# Patient Record
Sex: MALE | Race: WHITE | Employment: OTHER | URBAN - NONMETROPOLITAN AREA
[De-identification: names, ages, dates, MRNs, and addresses within clinical notes are randomized per-mention and may not be internally consistent; named-entity substitution may affect disease eponyms.]

---

## 2019-09-24 ENCOUNTER — ONCOLOGY VISIT (OUTPATIENT)
Dept: RADIATION ONCOLOGY | Facility: HOSPITAL | Age: 74
End: 2019-09-24
Attending: RADIOLOGY
Payer: COMMERCIAL

## 2019-09-24 VITALS
HEART RATE: 52 BPM | DIASTOLIC BLOOD PRESSURE: 60 MMHG | HEIGHT: 72 IN | RESPIRATION RATE: 16 BRPM | SYSTOLIC BLOOD PRESSURE: 114 MMHG | BODY MASS INDEX: 32.74 KG/M2 | WEIGHT: 241.7 LBS

## 2019-09-24 DIAGNOSIS — C61 PROSTATE CANCER (H): Primary | ICD-10-CM

## 2019-09-24 PROCEDURE — 99204 OFFICE O/P NEW MOD 45 MIN: CPT | Performed by: RADIOLOGY

## 2019-09-24 PROCEDURE — G0463 HOSPITAL OUTPT CLINIC VISIT: HCPCS | Performed by: RADIOLOGY

## 2019-09-24 RX ORDER — ATORVASTATIN CALCIUM 40 MG/1
TABLET, FILM COATED ORAL
COMMUNITY
Start: 2019-03-04

## 2019-09-24 RX ORDER — LISINOPRIL 40 MG/1
40 TABLET ORAL
COMMUNITY
Start: 2019-09-08

## 2019-09-24 RX ORDER — DOCUSATE SODIUM 100 MG/1
CAPSULE, LIQUID FILLED ORAL
Refills: 0 | COMMUNITY
Start: 2018-12-05

## 2019-09-24 RX ORDER — WARFARIN SODIUM 5 MG/1
TABLET ORAL
COMMUNITY
Start: 2019-09-11

## 2019-09-24 RX ORDER — SENNA AND DOCUSATE SODIUM 50; 8.6 MG/1; MG/1
2 TABLET, FILM COATED ORAL
COMMUNITY
Start: 2019-03-13

## 2019-09-24 RX ORDER — TAMSULOSIN HYDROCHLORIDE 0.4 MG/1
0.4 CAPSULE ORAL DAILY
COMMUNITY
Start: 2019-02-18

## 2019-09-24 RX ORDER — GABAPENTIN 300 MG/1
900 CAPSULE ORAL
COMMUNITY
Start: 2019-04-09

## 2019-09-24 RX ORDER — GLIMEPIRIDE 4 MG/1
TABLET ORAL
COMMUNITY
Start: 2018-10-03

## 2019-09-24 RX ORDER — CARVEDILOL 12.5 MG/1
TABLET ORAL
COMMUNITY
Start: 2018-10-03

## 2019-09-24 RX ORDER — FUROSEMIDE 20 MG
20 TABLET ORAL
COMMUNITY
Start: 2019-03-13

## 2019-09-24 RX ORDER — AMLODIPINE BESYLATE 10 MG/1
5 TABLET ORAL
COMMUNITY
Start: 2019-01-25

## 2019-09-24 RX ORDER — PAROXETINE 20 MG/1
20 TABLET, FILM COATED ORAL
COMMUNITY
Start: 2019-02-22

## 2019-09-24 RX ORDER — FLUTICASONE PROPIONATE 50 MCG
SPRAY, SUSPENSION (ML) NASAL
COMMUNITY
Start: 2017-04-13

## 2019-09-24 SDOH — HEALTH STABILITY: MENTAL HEALTH: HOW OFTEN DO YOU HAVE A DRINK CONTAINING ALCOHOL?: MONTHLY OR LESS

## 2019-09-24 SDOH — HEALTH STABILITY: MENTAL HEALTH: HOW OFTEN DO YOU HAVE 6 OR MORE DRINKS ON ONE OCCASION?: NOT ASKED

## 2019-09-24 SDOH — HEALTH STABILITY: MENTAL HEALTH: HOW MANY STANDARD DRINKS CONTAINING ALCOHOL DO YOU HAVE ON A TYPICAL DAY?: 1 OR 2

## 2019-09-24 ASSESSMENT — PATIENT HEALTH QUESTIONNAIRE - PHQ9: SUM OF ALL RESPONSES TO PHQ QUESTIONS 1-9: 4

## 2019-09-24 ASSESSMENT — MIFFLIN-ST. JEOR: SCORE: 1864.34

## 2019-09-24 ASSESSMENT — PAIN SCALES - GENERAL: PAINLEVEL: NO PAIN (0)

## 2019-09-24 NOTE — PROGRESS NOTES
RADIATION ONCOLOGY CONSULTATION       REFERRING PHYSICIANS: Abdon Watkins MD, Heladio Roy MD      DIAGNOSIS:   Adenocarcinoma of the prostate Gerald 7 (4+3), PSA 11.7, clinical T2b.      HISTORY OF PRESENT ILLNESS:   74-year-old man was being followed for PSA elevation.  In February 2016 it was 2.88.  PSA December 2018 was 9.13.  PSA  August 5, 2019 was 11.25 and on September 11, 2019 it was 11.7.  Ultrasound-guided biopsy of the prostate was done March 26, 2019.  At the time of the biopsy the prostate was estimated to be 20.5 cm .  12 cores were obtained.  6 of 12 cores showed adenocarcinoma with 4 cores showing [place] 7 (4+3) and 2 cores showing [place] 7 (3+4). All positive biopsies were from the left prostate. Perineural invasion was identified.  The patient elected initially to follow the PSA and is now seeking a consultation with the value of PSA having risen to 11.7.  He is the main caretaker of his wife who is wheelchair-bound and has had a stroke.  He has brothers who have a history of prostate cancer.  One brother had a seed implant done in [facility] about 20 to 25 years ago.  For this reason the patient is interested in information about prostate brachytherapy as well.      Past Medical History:   Diagnosis Date     DMII (diabetes mellitus, type 2) (H)      HLD (hyperlipidemia)      HTN (hypertension)      Prostate cancer (H)        There is no problem list on file for this patient.     ALLERGIES:  No Known Allergies         Family History   Problem Relation Age of Onset     Throat cancer Father      Prostate Cancer Brother      Prostate Cancer Brother          Social History     Socioeconomic History     Marital status:      Spouse name: Deepthi     Number of children: 1     Years of education: Not on file     Highest education level: Not on file   Occupational History     Occupation: Retail store development     Comment: retired   Social Needs     Financial resource strain: Not on  file     Food insecurity:     Worry: Not on file     Inability: Not on file     Transportation needs:     Medical: Not on file     Non-medical: Not on file   Tobacco Use     Smoking status: Former Smoker     Packs/day: 1.50     Years: 25.00     Pack years: 37.50     Types: Cigarettes     Last attempt to quit:      Years since quittin.7     Smokeless tobacco: Never Used   Substance and Sexual Activity     Alcohol use: Yes     Frequency: Monthly or less     Drinks per session: 1 or 2     Drug use: Not on file     Sexual activity: Not on file   Lifestyle     Physical activity:     Days per week: Not on file     Minutes per session: Not on file     Stress: Not on file   Relationships     Social connections:     Talks on phone: Not on file     Gets together: Not on file     Attends Oriental orthodox service: Not on file     Active member of club or organization: Not on file     Attends meetings of clubs or organizations: Not on file     Relationship status: Not on file     Intimate partner violence:     Fear of current or ex partner: Not on file     Emotionally abused: Not on file     Physically abused: Not on file     Forced sexual activity: Not on file   Other Topics Concern     Not on file   Social History Narrative     Not on file      PHYSICAL EXAMINATION:  GENERAL:  Reveals a cooperative, fairly healthy-appearing male.  He walks into the clinic accompanied by his son-in-law.  VITAL SIGNS:    /60 (BP Location: Left arm, Patient Position: Chair, Cuff Size: Adult Regular)   Pulse 52   Resp 16   Ht 1.829 m (6')   Wt 109.6 kg (241 lb 11.2 oz)   BMI 32.78 kg/m    ENT: Oral cavity without visible lesions.  He has upper dentures.  He has implants in the front of the lower mouth.  Neck is without palpable adenopathy.  Chest: He has distant breath sounds.  He has expiratory rhonchi throughout the chest.  Heart: S1 and S2 with irregular rhythm.  Abdomen: No distention, tenderness or palpable abdominal masses.  On  rectal examination there are no rectal lesions.  The inferior portion of the prostate is palpable with a nodularity left lower lateral prostate.  Extremities: Mild bilateral ankle edema.  No lesions noted.  Neuro: No lateralizing cranial nerve, motor or gait deficits.    IMPRESSION: 74-year-old man with adenocarcinoma the prostate [place] 7 (4+3), PSA 11.7, T2b, with 6 of 12 cores positive.      PLAN: I discussed radiation options with the patient and his son-in-law.  We discussed external beam therapy using conventional fractionation over 7 to 8 weeks with or without 4-6 month androgen deprivation therapy.  As per his request we discussed low-dose brachytherapy alone or in combination with external beam therapy.  We discussed high-dose-rate brachytherapy alone or in combination with external beam therapy.  I discussed the purpose, alternatives, and possible risks of these treatment approaches with the patient and his son-in-law.  I answered questions with them.  I indicated that I would lean towards external beam therapy with or without hormone treatment in his situation.  He is still interested in possible brachytherapy and would like to have more information on where that might be available.  I will gather more information for him about that possible treatment.  In light of guidelines for his risk category and risk calculators for lymph node rates, he is a candidate for a diagnostic bone scan and CT abdomen and pelvis and these were ordered.  I will communicate with him about possible brachytherapy facilities and results of the upcoming scans and then he will a begin to make final decisions regarding treatment.          Walter Laurent MD

## 2019-09-24 NOTE — PROGRESS NOTES
"INITIAL PATIENT ASSESSMENT    Chief Complaint   Patient presents with     Radiation Therapy       Initial /60 (BP Location: Left arm, Patient Position: Chair, Cuff Size: Adult Regular)   Pulse 52   Resp 16   Ht 1.829 m (6')   Wt 109.6 kg (241 lb 11.2 oz)   BMI 32.78 kg/m   Estimated body mass index is 32.78 kg/m  as calculated from the following:    Height as of this encounter: 1.829 m (6').    Weight as of this encounter: 109.6 kg (241 lb 11.2 oz).  Medication Reconciliation: complete        Referring Physician: keisha Gamboa  Other Physicians: [place]    Diagnosis: Prostate Cancer    Prior radiation therapy: None    Prior chemotherapy: None    Prior hormonal therapy:No    Pain Eval:  Denies    Psychosocial  Marital Status:    Spouse/Significant other: Deepthi    Children: 1  Occupation: retail store development     Retired: Yes  Living arrangements: home with wife  Do you feel safe at home? Yes  Activity status: independent   referral needs: Not needed    Advanced Directive: Yes - Location: at home, pt will bring in a copy    Patient was assessed for the influenza, pneumo-poly, prevnar 13, Tdap, and shingles immunizations. \"Vaccinations and Cancer Treatment\" flyer given.  Instructed patient to discuss vaccination status with his/her PCM.     Patient was assessed using the NCCN psychosocial distress thermometer. Patient rated the score as a 0. Patient rated current stressors as n/a. Stressors will be brought to the attention of provider or Oncology RN Care Coordinator for a score of 6 or greater or per nurses discretion.     Pt is here today for a consult for radiation therapy for prostate cancer.  Educated patient on the mapping process and the possible side effects of external beam XRT to the pelvis, to include: fatigue, skin reaction, diarrhea, and bladder irritation.  Pt does express a desire to explore brachytherapy options, as well.  Dr. Laurent notified.  Pt verbalizes " an understanding and has no questions at this time. Pt is accompanied by his son today.    Edi Carballo RN

## 2019-09-24 NOTE — PROGRESS NOTES
"[facility]  Nutrition Assessment    Max Calabrese    1945   Sep 24, 2019    Diagnosis: prostate cancer    Height: 6' 0\" Weight: 241 lbs 11.2 oz    Usual Weight: 241 Weight Change: 0      Past Medical History:   Diagnosis Date     DMII (diabetes mellitus, type 2) (H)      HLD (hyperlipidemia)      HTN (hypertension)      Prostate cancer (H)        1. Receiving Chemotherapy? No - 0 points  2. Weight Loss per Month (in pounds) Based on Usual Weight: 0    100# 100-120# 120-150# 150-200# >200# Pt. System   > 5 5 - 6 6 - 8 7 - 10 > 10 1 Point   > 7 7- 9 9 - 11 11 - 14 > 15 2 Points   > 10 10 - 12 12 - 15 15 - 20 > 20 3 Points       3. Eating Problems: ( 1 Point for Each Problem)       Loss of Appetite     No - 0 points    Difficulty Swallowing    No - 0 points   Nausea    No - 0 points    Early Satiety    No - 0 points   Vomiting    No - 0 points    Taste/Smell Aversions  No - 0 points    Diarrhea    No - 0 points    Esophageal Reflux   No - 0 points   Mouth Soreness/Difficulty Chewing  No - 0 points          Total: 0    4.  Automatic Referral for the Following Diagnosis or Situations: n/a     Comments: n/a    Total Score: 0 (Scores >/= 4 will receive a Dietician Consult)        Edi Carballo RN  "

## 2019-09-27 ENCOUNTER — VIRTUAL VISIT (OUTPATIENT)
Dept: RADIATION ONCOLOGY | Facility: HOSPITAL | Age: 74
End: 2019-09-27

## 2019-09-27 DIAGNOSIS — C61 PROSTATE CANCER (H): Primary | ICD-10-CM

## 2019-09-27 NOTE — PROGRESS NOTES
PHONE CALL    The patient had asked for more information about possible prostate brachytherapy. I contacted [facility] and I discussed Mr. Calabrese's case with hs Urologist, Dr. Gamboa. It is felt he is not a good candidate for brachytherapy in light of his stage of disease and his other medical issues. I communicated by phone with the patient last evening the above information and recommended external beam irradiation along with short course androgen deprivation therapy. We will proceed with the staging CT scans and bone scan and review results and then, if no metastatic disease, schedule simulation and and initial one month hormone shot. He seems to understand and agrees with this plan. I asked him to contact us if he has questions.     Walter Laurent MD

## 2019-10-03 ENCOUNTER — TRANSFERRED RECORDS (OUTPATIENT)
Dept: HEALTH INFORMATION MANAGEMENT | Facility: CLINIC | Age: 74
End: 2019-10-03

## 2019-10-09 ENCOUNTER — VIRTUAL VISIT (OUTPATIENT)
Dept: RADIATION ONCOLOGY | Facility: HOSPITAL | Age: 74
End: 2019-10-09

## 2019-10-09 DIAGNOSIS — C61 PROSTATE CANCER (H): Primary | ICD-10-CM

## 2019-10-09 NOTE — PROGRESS NOTES
Radiation Oncology  Phone Note    CT Chest Abdomen Pelvis October 3, 2019 were without evidence of metastatic disease.     The plan is ADH and external beam radiation treatment, 75-77 Gy over 8 weeks. I discussed the patient's case again with Dr. Gamboa. He agrees to contact the patient to give the ADH. On a phone call with the patient today, Mr. Calabrese will proceed with simulation 1-2 months after the ADH starts. I will contact him again the week of October 28 to schedule simulation so that he could start treatment 1-2 weeks after simulation. If he does not hear from Dr. Gamboa's office today he will contact them tomorrow or the next day. Mr. Calabrese will contact us if he has other questions.     Walter Laurent MD

## 2019-10-16 DIAGNOSIS — Z12.5 SPECIAL SCREENING FOR MALIGNANT NEOPLASM OF PROSTATE: Primary | ICD-10-CM

## 2019-10-31 ENCOUNTER — HOSPITAL ENCOUNTER (OUTPATIENT)
Dept: MRI IMAGING | Facility: HOSPITAL | Age: 74
Setting detail: RADIATION/ONCOLOGY SERIES
End: 2019-10-31
Attending: RADIOLOGY
Payer: MEDICARE

## 2019-10-31 ENCOUNTER — ALLIED HEALTH/NURSE VISIT (OUTPATIENT)
Dept: RADIATION ONCOLOGY | Facility: HOSPITAL | Age: 74
End: 2019-10-31
Attending: RADIOLOGY
Payer: COMMERCIAL

## 2019-10-31 DIAGNOSIS — C61 PROSTATE CANCER (H): Primary | ICD-10-CM

## 2019-10-31 DIAGNOSIS — Z12.5 SPECIAL SCREENING FOR MALIGNANT NEOPLASM OF PROSTATE: ICD-10-CM

## 2019-10-31 PROCEDURE — 77334 RADIATION TREATMENT AID(S): CPT | Performed by: RADIOLOGY

## 2019-10-31 PROCEDURE — 77334 RADIATION TREATMENT AID(S): CPT | Mod: 26 | Performed by: RADIOLOGY

## 2019-10-31 PROCEDURE — 40000268 MR LIMITED SCAN: Mod: TC

## 2019-10-31 PROCEDURE — 77263 THER RADIOLOGY TX PLNG CPLX: CPT | Performed by: RADIOLOGY

## 2019-10-31 NOTE — PROGRESS NOTES
Encounter Date: Oct 31, 2019  Walter Laurent MD     RADIATION THERAPY SIMULATION NOTE       Max Calabrese was brought into the simulation suite and placed in a modified supine position. He had followed the bladder protocol. Careful alignment was accomplished using orthogonal lasers.  Positioning was aided with the use of a vacuum bag.  Imaging was acquired through the pelvic area and isocenter placed in the region of the prostatic fossa.  All imaging will be used for highly conformal IMRT-based treatment planning for the purpose of  radiation therapy for intermediate risk, unfavorable, prostate cancer.  IMRT techniques will allow proper target dose delineation, isodose planning and dose escalation with adequate sparing of bladder, rectum and bilateral proximal femurs and potentially better delineation of previous radiation therapy treatment volume for stage. An MRI of the prostate was obtained to aid with treatment planning. A post void CT scan at the end of simulation showed residual of 390 ml. The patient is on one flomax per day. He reports nocturia twice a night and no difficulty with starting urination. He does not wear a pad and on occasion has mild stress incontinence. He is not really bothered by urinary symptoms per history today. He started on Lupron several weeks ago. MRI shows significant disease in the prostate. Discussed with Dr. Gamboa. Will proceed with planning and treatment.            Walter Laurent MD

## 2019-11-01 ENCOUNTER — TELEPHONE (OUTPATIENT)
Dept: RADIATION ONCOLOGY | Facility: HOSPITAL | Age: 74
End: 2019-11-01

## 2019-11-01 NOTE — TELEPHONE ENCOUNTER
RADIATION ONCOLOGY    Telephone note    I called the patient and left a message that we will be proceeding with radiation treatment as planned. Our RTT''s will contact him with the schedule when available from Dosimetry.     Walter Laurent MD

## 2019-11-14 PROCEDURE — 77300 RADIATION THERAPY DOSE PLAN: CPT | Mod: 26 | Performed by: RADIOLOGY

## 2019-11-14 PROCEDURE — 77338 DESIGN MLC DEVICE FOR IMRT: CPT | Performed by: RADIOLOGY

## 2019-11-14 PROCEDURE — 77300 RADIATION THERAPY DOSE PLAN: CPT | Performed by: RADIOLOGY

## 2019-11-14 PROCEDURE — 77338 DESIGN MLC DEVICE FOR IMRT: CPT | Mod: 26 | Performed by: RADIOLOGY

## 2019-11-15 PROCEDURE — 77301 RADIOTHERAPY DOSE PLAN IMRT: CPT | Performed by: RADIOLOGY

## 2019-11-15 PROCEDURE — 77301 RADIOTHERAPY DOSE PLAN IMRT: CPT | Mod: 26 | Performed by: RADIOLOGY

## 2019-11-18 ENCOUNTER — APPOINTMENT (OUTPATIENT)
Dept: RADIATION ONCOLOGY | Facility: HOSPITAL | Age: 74
End: 2019-11-18
Attending: RADIOLOGY
Payer: MEDICARE

## 2019-11-18 ENCOUNTER — RESULTS ONLY (OUTPATIENT)
Dept: RADIATION ONCOLOGY | Facility: HOSPITAL | Age: 74
End: 2019-11-18

## 2019-11-18 LAB
RAD ONC ARIA COURSE ID: NORMAL
RAD ONC ARIA COURSE LAST TREATMENT DATE: NORMAL
RAD ONC ARIA COURSE START DATE: NORMAL
RAD ONC ARIA COURSE TREATMENT ELAPSED DAYS: 0
RAD ONC ARIA FIRST TREATMENT DATE: NORMAL
RAD ONC ARIA PLAN FRACTIONS TREATED TO DATE: 1
RAD ONC ARIA PLAN ID: NORMAL
RAD ONC ARIA PLAN NAME: NORMAL
RAD ONC ARIA PLAN PRESCRIBED DOSE PER FRACTION: 2 GY
RAD ONC ARIA PLAN TOTAL FRACTIONS PRESCRIBED: 38
RAD ONC ARIA PLAN TOTAL PRESCRIBED DOSE: 7600 CGY
RAD ONC ARIA REFERENCE POINT DOSAGE GIVEN TO DATE: NORMAL GY
RAD ONC ARIA REFERENCE POINT ID: NORMAL

## 2019-11-18 PROCEDURE — 77014 ZZHC CT GUIDE FOR PLACEMENT RADIATION THERAPY FIELDS: CPT | Mod: 26 | Performed by: RADIOLOGY

## 2019-11-18 PROCEDURE — 77385 ZZH IMRT TREATMENT DELIVERY, SIMPLE: CPT | Performed by: RADIOLOGY

## 2019-11-19 ENCOUNTER — APPOINTMENT (OUTPATIENT)
Dept: RADIATION ONCOLOGY | Facility: HOSPITAL | Age: 74
End: 2019-11-19
Payer: COMMERCIAL

## 2019-11-19 ENCOUNTER — RESULTS ONLY (OUTPATIENT)
Dept: RADIATION ONCOLOGY | Facility: HOSPITAL | Age: 74
End: 2019-11-19

## 2019-11-19 LAB
RAD ONC ARIA COURSE ID: NORMAL
RAD ONC ARIA COURSE LAST TREATMENT DATE: NORMAL
RAD ONC ARIA COURSE START DATE: NORMAL
RAD ONC ARIA COURSE TREATMENT ELAPSED DAYS: 1
RAD ONC ARIA FIRST TREATMENT DATE: NORMAL
RAD ONC ARIA PLAN FRACTIONS TREATED TO DATE: 2
RAD ONC ARIA PLAN ID: NORMAL
RAD ONC ARIA PLAN NAME: NORMAL
RAD ONC ARIA PLAN PRESCRIBED DOSE PER FRACTION: 2 GY
RAD ONC ARIA PLAN TOTAL FRACTIONS PRESCRIBED: 38
RAD ONC ARIA PLAN TOTAL PRESCRIBED DOSE: 7600 CGY
RAD ONC ARIA REFERENCE POINT DOSAGE GIVEN TO DATE: NORMAL GY
RAD ONC ARIA REFERENCE POINT ID: NORMAL

## 2019-11-19 PROCEDURE — 77014 ZZHC CT GUIDE FOR PLACEMENT RADIATION THERAPY FIELDS: CPT | Mod: 26 | Performed by: RADIOLOGY

## 2019-11-19 PROCEDURE — 77385 ZZH IMRT TREATMENT DELIVERY, SIMPLE: CPT | Performed by: RADIOLOGY

## 2019-11-20 ENCOUNTER — RESULTS ONLY (OUTPATIENT)
Dept: RADIATION ONCOLOGY | Facility: HOSPITAL | Age: 74
End: 2019-11-20

## 2019-11-20 ENCOUNTER — OFFICE VISIT (OUTPATIENT)
Dept: RADIATION ONCOLOGY | Facility: HOSPITAL | Age: 74
End: 2019-11-20
Attending: RADIOLOGY
Payer: MEDICARE

## 2019-11-20 VITALS
WEIGHT: 247.2 LBS | DIASTOLIC BLOOD PRESSURE: 72 MMHG | SYSTOLIC BLOOD PRESSURE: 144 MMHG | HEART RATE: 52 BPM | RESPIRATION RATE: 16 BRPM | BODY MASS INDEX: 33.53 KG/M2

## 2019-11-20 DIAGNOSIS — C61 PROSTATE CANCER (H): Primary | ICD-10-CM

## 2019-11-20 LAB
RAD ONC ARIA COURSE ID: NORMAL
RAD ONC ARIA COURSE LAST TREATMENT DATE: NORMAL
RAD ONC ARIA COURSE START DATE: NORMAL
RAD ONC ARIA COURSE TREATMENT ELAPSED DAYS: 2
RAD ONC ARIA FIRST TREATMENT DATE: NORMAL
RAD ONC ARIA PLAN FRACTIONS TREATED TO DATE: 3
RAD ONC ARIA PLAN ID: NORMAL
RAD ONC ARIA PLAN NAME: NORMAL
RAD ONC ARIA PLAN PRESCRIBED DOSE PER FRACTION: 2 GY
RAD ONC ARIA PLAN TOTAL FRACTIONS PRESCRIBED: 38
RAD ONC ARIA PLAN TOTAL PRESCRIBED DOSE: 7600 CGY
RAD ONC ARIA REFERENCE POINT DOSAGE GIVEN TO DATE: NORMAL GY
RAD ONC ARIA REFERENCE POINT ID: NORMAL

## 2019-11-20 PROCEDURE — 77385 ZZH IMRT TREATMENT DELIVERY, SIMPLE: CPT | Performed by: RADIOLOGY

## 2019-11-20 PROCEDURE — 77014 ZZHC CT GUIDE FOR PLACEMENT RADIATION THERAPY FIELDS: CPT | Mod: 26 | Performed by: RADIOLOGY

## 2019-11-20 ASSESSMENT — PAIN SCALES - GENERAL: PAINLEVEL: NO PAIN (0)

## 2019-11-20 NOTE — PROGRESS NOTES
Radiation Oncology - On Treatment Visit Note     ID: 74 year old male with The encounter diagnosis was Prostate cancer (H).. He is here today during treatment for an on treatment visit.     Planned Total RT Dose: 76 Gy.   Current Fraction: 3 of 38.   Concurrent Chemotherapy: ADT     SUBJECTIVE:   No side effects yet.  Has an hour drive and asking if I can reduce the number of treatments.       OBJECTIVE:   Vital Signs: BP (!) 144/72 (BP Location: Left arm, Patient Position: Chair, Cuff Size: Adult Large)   Pulse 52   Resp 16   Wt 112.1 kg (247 lb 3.2 oz)   BMI 33.53 kg/m   Pain Score: 0   Wt Readings from Last 5 Encounters:   11/20/19 112.1 kg (247 lb 3.2 oz)   09/24/19 109.6 kg (241 lb 11.2 oz)      ASSESSMENT/PLAN:   Unfavorable intermediate risk prostate cancer (cT2b, [place] 4+3, pretreatment PSA 11.7 ng/ml)  Definitive EBRT + ADT  The patient's chart and films were reviewed.   Cont RT.   I will evaluate if shortening course to ~70Gy/28fxs is safe for him and will alter his treatments if so.      Leo Santos MD, MPH   Radiation Oncologist - [facility]

## 2019-11-22 ENCOUNTER — RESULTS ONLY (OUTPATIENT)
Dept: RADIATION ONCOLOGY | Facility: HOSPITAL | Age: 74
End: 2019-11-22

## 2019-11-22 ENCOUNTER — APPOINTMENT (OUTPATIENT)
Dept: RADIATION ONCOLOGY | Facility: HOSPITAL | Age: 74
End: 2019-11-22
Payer: COMMERCIAL

## 2019-11-22 LAB
RAD ONC ARIA COURSE ID: NORMAL
RAD ONC ARIA COURSE LAST TREATMENT DATE: NORMAL
RAD ONC ARIA COURSE START DATE: NORMAL
RAD ONC ARIA COURSE TREATMENT ELAPSED DAYS: 4
RAD ONC ARIA FIRST TREATMENT DATE: NORMAL
RAD ONC ARIA PLAN FRACTIONS TREATED TO DATE: 4
RAD ONC ARIA PLAN ID: NORMAL
RAD ONC ARIA PLAN NAME: NORMAL
RAD ONC ARIA PLAN PRESCRIBED DOSE PER FRACTION: 2 GY
RAD ONC ARIA PLAN TOTAL FRACTIONS PRESCRIBED: 38
RAD ONC ARIA PLAN TOTAL PRESCRIBED DOSE: 7600 CGY
RAD ONC ARIA REFERENCE POINT DOSAGE GIVEN TO DATE: NORMAL GY
RAD ONC ARIA REFERENCE POINT ID: NORMAL

## 2019-11-22 PROCEDURE — 77385 ZZH IMRT TREATMENT DELIVERY, SIMPLE: CPT | Performed by: RADIOLOGY

## 2019-11-22 PROCEDURE — 77014 ZZHC CT GUIDE FOR PLACEMENT RADIATION THERAPY FIELDS: CPT | Mod: 26 | Performed by: RADIOLOGY

## 2019-11-25 ENCOUNTER — RESULTS ONLY (OUTPATIENT)
Dept: RADIATION ONCOLOGY | Facility: HOSPITAL | Age: 74
End: 2019-11-25

## 2019-11-25 ENCOUNTER — APPOINTMENT (OUTPATIENT)
Dept: RADIATION ONCOLOGY | Facility: HOSPITAL | Age: 74
End: 2019-11-25
Payer: COMMERCIAL

## 2019-11-25 LAB
RAD ONC ARIA COURSE ID: NORMAL
RAD ONC ARIA COURSE LAST TREATMENT DATE: NORMAL
RAD ONC ARIA COURSE START DATE: NORMAL
RAD ONC ARIA COURSE TREATMENT ELAPSED DAYS: 7
RAD ONC ARIA FIRST TREATMENT DATE: NORMAL
RAD ONC ARIA PLAN FRACTIONS TREATED TO DATE: 5
RAD ONC ARIA PLAN ID: NORMAL
RAD ONC ARIA PLAN NAME: NORMAL
RAD ONC ARIA PLAN PRESCRIBED DOSE PER FRACTION: 2 GY
RAD ONC ARIA PLAN TOTAL FRACTIONS PRESCRIBED: 38
RAD ONC ARIA PLAN TOTAL PRESCRIBED DOSE: 7600 CGY
RAD ONC ARIA REFERENCE POINT DOSAGE GIVEN TO DATE: NORMAL GY
RAD ONC ARIA REFERENCE POINT ID: NORMAL

## 2019-11-25 PROCEDURE — 77427 RADIATION TX MANAGEMENT X5: CPT | Performed by: RADIOLOGY

## 2019-11-25 PROCEDURE — 77014 ZZHC CT GUIDE FOR PLACEMENT RADIATION THERAPY FIELDS: CPT | Mod: 26 | Performed by: RADIOLOGY

## 2019-11-25 PROCEDURE — 77385 ZZH IMRT TREATMENT DELIVERY, SIMPLE: CPT | Performed by: RADIOLOGY

## 2019-11-25 PROCEDURE — 77336 RADIATION PHYSICS CONSULT: CPT | Performed by: RADIOLOGY

## 2019-11-26 ENCOUNTER — APPOINTMENT (OUTPATIENT)
Dept: RADIATION ONCOLOGY | Facility: HOSPITAL | Age: 74
End: 2019-11-26
Payer: COMMERCIAL

## 2019-11-26 ENCOUNTER — RESULTS ONLY (OUTPATIENT)
Dept: RADIATION ONCOLOGY | Facility: HOSPITAL | Age: 74
End: 2019-11-26

## 2019-11-26 LAB
RAD ONC ARIA COURSE ID: NORMAL
RAD ONC ARIA COURSE LAST TREATMENT DATE: NORMAL
RAD ONC ARIA COURSE START DATE: NORMAL
RAD ONC ARIA COURSE TREATMENT ELAPSED DAYS: 8
RAD ONC ARIA FIRST TREATMENT DATE: NORMAL
RAD ONC ARIA PLAN FRACTIONS TREATED TO DATE: 6
RAD ONC ARIA PLAN ID: NORMAL
RAD ONC ARIA PLAN NAME: NORMAL
RAD ONC ARIA PLAN PRESCRIBED DOSE PER FRACTION: 2 GY
RAD ONC ARIA PLAN TOTAL FRACTIONS PRESCRIBED: 38
RAD ONC ARIA PLAN TOTAL PRESCRIBED DOSE: 7600 CGY
RAD ONC ARIA REFERENCE POINT DOSAGE GIVEN TO DATE: NORMAL GY
RAD ONC ARIA REFERENCE POINT ID: NORMAL

## 2019-11-26 PROCEDURE — 77385 ZZH IMRT TREATMENT DELIVERY, SIMPLE: CPT | Performed by: RADIOLOGY

## 2019-11-26 PROCEDURE — 77014 ZZHC CT GUIDE FOR PLACEMENT RADIATION THERAPY FIELDS: CPT | Mod: 26 | Performed by: RADIOLOGY

## 2019-11-27 ENCOUNTER — OFFICE VISIT (OUTPATIENT)
Dept: RADIATION ONCOLOGY | Facility: HOSPITAL | Age: 74
End: 2019-11-27
Payer: COMMERCIAL

## 2019-11-27 ENCOUNTER — RESULTS ONLY (OUTPATIENT)
Dept: RADIATION ONCOLOGY | Facility: HOSPITAL | Age: 74
End: 2019-11-27

## 2019-11-27 VITALS
BODY MASS INDEX: 33.61 KG/M2 | HEART RATE: 52 BPM | DIASTOLIC BLOOD PRESSURE: 66 MMHG | SYSTOLIC BLOOD PRESSURE: 154 MMHG | WEIGHT: 247.8 LBS | RESPIRATION RATE: 16 BRPM

## 2019-11-27 DIAGNOSIS — C61 PROSTATE CANCER (H): Primary | ICD-10-CM

## 2019-11-27 LAB
RAD ONC ARIA COURSE ID: NORMAL
RAD ONC ARIA COURSE LAST TREATMENT DATE: NORMAL
RAD ONC ARIA COURSE START DATE: NORMAL
RAD ONC ARIA COURSE TREATMENT ELAPSED DAYS: 9
RAD ONC ARIA FIRST TREATMENT DATE: NORMAL
RAD ONC ARIA PLAN FRACTIONS TREATED TO DATE: 7
RAD ONC ARIA PLAN ID: NORMAL
RAD ONC ARIA PLAN NAME: NORMAL
RAD ONC ARIA PLAN PRESCRIBED DOSE PER FRACTION: 2 GY
RAD ONC ARIA PLAN TOTAL FRACTIONS PRESCRIBED: 38
RAD ONC ARIA PLAN TOTAL PRESCRIBED DOSE: 7600 CGY
RAD ONC ARIA REFERENCE POINT DOSAGE GIVEN TO DATE: NORMAL GY
RAD ONC ARIA REFERENCE POINT ID: NORMAL

## 2019-11-27 PROCEDURE — 77385 ZZH IMRT TREATMENT DELIVERY, SIMPLE: CPT | Performed by: RADIOLOGY

## 2019-11-27 PROCEDURE — 77014 ZZHC CT GUIDE FOR PLACEMENT RADIATION THERAPY FIELDS: CPT | Mod: 26 | Performed by: RADIOLOGY

## 2019-11-27 ASSESSMENT — PAIN SCALES - GENERAL: PAINLEVEL: NO PAIN (0)

## 2019-12-02 ENCOUNTER — APPOINTMENT (OUTPATIENT)
Dept: RADIATION ONCOLOGY | Facility: HOSPITAL | Age: 74
End: 2019-12-02
Attending: RADIOLOGY
Payer: MEDICARE

## 2019-12-02 ENCOUNTER — RESULTS ONLY (OUTPATIENT)
Dept: RADIATION ONCOLOGY | Facility: HOSPITAL | Age: 74
End: 2019-12-02

## 2019-12-02 LAB
RAD ONC ARIA COURSE ID: NORMAL
RAD ONC ARIA COURSE LAST TREATMENT DATE: NORMAL
RAD ONC ARIA COURSE START DATE: NORMAL
RAD ONC ARIA COURSE TREATMENT ELAPSED DAYS: 14
RAD ONC ARIA FIRST TREATMENT DATE: NORMAL
RAD ONC ARIA PLAN FRACTIONS TREATED TO DATE: 1
RAD ONC ARIA PLAN ID: NORMAL
RAD ONC ARIA PLAN NAME: NORMAL
RAD ONC ARIA PLAN PRESCRIBED DOSE PER FRACTION: 2.5 GY
RAD ONC ARIA PLAN TOTAL FRACTIONS PRESCRIBED: 23
RAD ONC ARIA PLAN TOTAL PRESCRIBED DOSE: 5750 CGY
RAD ONC ARIA REFERENCE POINT DOSAGE GIVEN TO DATE: NORMAL GY
RAD ONC ARIA REFERENCE POINT ID: NORMAL

## 2019-12-02 PROCEDURE — 77014 ZZHC CT GUIDE FOR PLACEMENT RADIATION THERAPY FIELDS: CPT | Mod: 26 | Performed by: RADIOLOGY

## 2019-12-02 PROCEDURE — 77385 ZZH IMRT TREATMENT DELIVERY, SIMPLE: CPT | Performed by: RADIOLOGY

## 2019-12-03 ENCOUNTER — RESULTS ONLY (OUTPATIENT)
Dept: RADIATION ONCOLOGY | Facility: HOSPITAL | Age: 74
End: 2019-12-03

## 2019-12-03 ENCOUNTER — APPOINTMENT (OUTPATIENT)
Dept: RADIATION ONCOLOGY | Facility: HOSPITAL | Age: 74
End: 2019-12-03
Payer: COMMERCIAL

## 2019-12-03 LAB
RAD ONC ARIA COURSE ID: NORMAL
RAD ONC ARIA COURSE LAST TREATMENT DATE: NORMAL
RAD ONC ARIA COURSE START DATE: NORMAL
RAD ONC ARIA COURSE TREATMENT ELAPSED DAYS: 15
RAD ONC ARIA FIRST TREATMENT DATE: NORMAL
RAD ONC ARIA PLAN FRACTIONS TREATED TO DATE: 2
RAD ONC ARIA PLAN ID: NORMAL
RAD ONC ARIA PLAN NAME: NORMAL
RAD ONC ARIA PLAN PRESCRIBED DOSE PER FRACTION: 2.5 GY
RAD ONC ARIA PLAN TOTAL FRACTIONS PRESCRIBED: 23
RAD ONC ARIA PLAN TOTAL PRESCRIBED DOSE: 5750 CGY
RAD ONC ARIA REFERENCE POINT DOSAGE GIVEN TO DATE: NORMAL GY
RAD ONC ARIA REFERENCE POINT ID: NORMAL

## 2019-12-03 PROCEDURE — 77385 ZZH IMRT TREATMENT DELIVERY, SIMPLE: CPT | Performed by: RADIOLOGY

## 2019-12-03 PROCEDURE — 77014 ZZHC CT GUIDE FOR PLACEMENT RADIATION THERAPY FIELDS: CPT | Mod: 26 | Performed by: RADIOLOGY

## 2019-12-04 ENCOUNTER — RESULTS ONLY (OUTPATIENT)
Dept: RADIATION ONCOLOGY | Facility: HOSPITAL | Age: 74
End: 2019-12-04

## 2019-12-04 ENCOUNTER — OFFICE VISIT (OUTPATIENT)
Dept: RADIATION ONCOLOGY | Facility: HOSPITAL | Age: 74
End: 2019-12-04
Attending: RADIOLOGY
Payer: MEDICARE

## 2019-12-04 VITALS
SYSTOLIC BLOOD PRESSURE: 144 MMHG | WEIGHT: 244.2 LBS | BODY MASS INDEX: 33.12 KG/M2 | RESPIRATION RATE: 16 BRPM | HEART RATE: 52 BPM | DIASTOLIC BLOOD PRESSURE: 56 MMHG

## 2019-12-04 DIAGNOSIS — C61 PROSTATE CANCER (H): Primary | ICD-10-CM

## 2019-12-04 LAB
RAD ONC ARIA COURSE ID: NORMAL
RAD ONC ARIA COURSE LAST TREATMENT DATE: NORMAL
RAD ONC ARIA COURSE START DATE: NORMAL
RAD ONC ARIA COURSE TREATMENT ELAPSED DAYS: 16
RAD ONC ARIA FIRST TREATMENT DATE: NORMAL
RAD ONC ARIA PLAN FRACTIONS TREATED TO DATE: 3
RAD ONC ARIA PLAN ID: NORMAL
RAD ONC ARIA PLAN NAME: NORMAL
RAD ONC ARIA PLAN PRESCRIBED DOSE PER FRACTION: 2.5 GY
RAD ONC ARIA PLAN TOTAL FRACTIONS PRESCRIBED: 23
RAD ONC ARIA PLAN TOTAL PRESCRIBED DOSE: 5750 CGY
RAD ONC ARIA REFERENCE POINT DOSAGE GIVEN TO DATE: NORMAL GY
RAD ONC ARIA REFERENCE POINT ID: NORMAL

## 2019-12-04 PROCEDURE — 77014 ZZHC CT GUIDE FOR PLACEMENT RADIATION THERAPY FIELDS: CPT | Mod: 26 | Performed by: RADIOLOGY

## 2019-12-04 PROCEDURE — 77385 ZZH IMRT TREATMENT DELIVERY, SIMPLE: CPT | Performed by: RADIOLOGY

## 2019-12-04 PROCEDURE — 77336 RADIATION PHYSICS CONSULT: CPT | Performed by: RADIOLOGY

## 2019-12-04 PROCEDURE — 77427 RADIATION TX MANAGEMENT X5: CPT | Performed by: RADIOLOGY

## 2019-12-04 ASSESSMENT — PAIN SCALES - GENERAL: PAINLEVEL: NO PAIN (0)

## 2019-12-04 NOTE — PROGRESS NOTES
Radiation Oncology - On Treatment Visit Note     ID: 74 year old male with The encounter diagnosis was Prostate cancer (H).. He is here today during treatment for an on treatment visit.     Planned Total RT Dose: 71.5 Gy (14 Gy initial plan + 57.5 Gy revised plan)  Current Fraction: 10 of 30 (7 initial plan, 23 revised plan).   Concurrent Chemotherapy: ADT     SUBJECTIVE:   Doubled dose of Flomax and has seen improvement in his urination.  No LH or dizziness.  No other side effects.      OBJECTIVE:   Vital Signs: BP (!) 144/56 (BP Location: Left arm, Patient Position: Chair, Cuff Size: Adult Large)   Pulse 52   Resp 16   Wt 110.8 kg (244 lb 3.2 oz)   BMI 33.12 kg/m   Pain Score: 0   Wt Readings from Last 5 Encounters:   12/04/19 110.8 kg (244 lb 3.2 oz)   11/27/19 112.4 kg (247 lb 12.8 oz)   11/20/19 112.1 kg (247 lb 3.2 oz)   09/24/19 109.6 kg (241 lb 11.2 oz)      ASSESSMENT/PLAN:   Unfavorable intermediate risk prostate cancer (cT2b, Gerald 4+3, pretreatment PSA 11.7 ng/ml)  Definitive EBRT + ADT  The patient's chart and films were reviewed.   Cont RT.   I changed plan to hypofx.  Reduced from 38 to 30 total fxs.  (BED equivalent of 70Gy/28fxs)  Cont double dose Flomax.  Can return to regular dose 1-2 months after RT is completed.      Leo Santos MD, MPH   Radiation Oncologist - [facility]

## 2019-12-05 ENCOUNTER — APPOINTMENT (OUTPATIENT)
Dept: RADIATION ONCOLOGY | Facility: HOSPITAL | Age: 74
End: 2019-12-05
Payer: COMMERCIAL

## 2019-12-05 ENCOUNTER — RESULTS ONLY (OUTPATIENT)
Dept: RADIATION ONCOLOGY | Facility: HOSPITAL | Age: 74
End: 2019-12-05

## 2019-12-05 LAB
RAD ONC ARIA COURSE ID: NORMAL
RAD ONC ARIA COURSE LAST TREATMENT DATE: NORMAL
RAD ONC ARIA COURSE START DATE: NORMAL
RAD ONC ARIA COURSE TREATMENT ELAPSED DAYS: 17
RAD ONC ARIA FIRST TREATMENT DATE: NORMAL
RAD ONC ARIA PLAN FRACTIONS TREATED TO DATE: 4
RAD ONC ARIA PLAN ID: NORMAL
RAD ONC ARIA PLAN NAME: NORMAL
RAD ONC ARIA PLAN PRESCRIBED DOSE PER FRACTION: 2.5 GY
RAD ONC ARIA PLAN TOTAL FRACTIONS PRESCRIBED: 23
RAD ONC ARIA PLAN TOTAL PRESCRIBED DOSE: 5750 CGY
RAD ONC ARIA REFERENCE POINT DOSAGE GIVEN TO DATE: NORMAL GY
RAD ONC ARIA REFERENCE POINT ID: NORMAL

## 2019-12-05 PROCEDURE — 77014 ZZHC CT GUIDE FOR PLACEMENT RADIATION THERAPY FIELDS: CPT | Mod: 26 | Performed by: RADIOLOGY

## 2019-12-05 PROCEDURE — 77385 ZZH IMRT TREATMENT DELIVERY, SIMPLE: CPT | Performed by: RADIOLOGY

## 2019-12-06 ENCOUNTER — RESULTS ONLY (OUTPATIENT)
Dept: RADIATION ONCOLOGY | Facility: HOSPITAL | Age: 74
End: 2019-12-06

## 2019-12-06 ENCOUNTER — TELEPHONE (OUTPATIENT)
Dept: RADIATION ONCOLOGY | Facility: HOSPITAL | Age: 74
End: 2019-12-06

## 2019-12-06 ENCOUNTER — APPOINTMENT (OUTPATIENT)
Dept: RADIATION ONCOLOGY | Facility: HOSPITAL | Age: 74
End: 2019-12-06
Payer: COMMERCIAL

## 2019-12-06 DIAGNOSIS — C61 PROSTATE CANCER (H): ICD-10-CM

## 2019-12-06 LAB
RAD ONC ARIA COURSE ID: NORMAL
RAD ONC ARIA COURSE LAST TREATMENT DATE: NORMAL
RAD ONC ARIA COURSE START DATE: NORMAL
RAD ONC ARIA COURSE TREATMENT ELAPSED DAYS: 18
RAD ONC ARIA FIRST TREATMENT DATE: NORMAL
RAD ONC ARIA PLAN FRACTIONS TREATED TO DATE: 5
RAD ONC ARIA PLAN ID: NORMAL
RAD ONC ARIA PLAN NAME: NORMAL
RAD ONC ARIA PLAN PRESCRIBED DOSE PER FRACTION: 2.5 GY
RAD ONC ARIA PLAN TOTAL FRACTIONS PRESCRIBED: 23
RAD ONC ARIA PLAN TOTAL PRESCRIBED DOSE: 5750 CGY
RAD ONC ARIA REFERENCE POINT DOSAGE GIVEN TO DATE: NORMAL GY
RAD ONC ARIA REFERENCE POINT ID: NORMAL

## 2019-12-06 PROCEDURE — 77014 ZZHC CT GUIDE FOR PLACEMENT RADIATION THERAPY FIELDS: CPT | Mod: 26 | Performed by: RADIOLOGY

## 2019-12-06 PROCEDURE — 77385 ZZH IMRT TREATMENT DELIVERY, SIMPLE: CPT | Performed by: RADIOLOGY

## 2019-12-09 ENCOUNTER — APPOINTMENT (OUTPATIENT)
Dept: RADIATION ONCOLOGY | Facility: HOSPITAL | Age: 74
End: 2019-12-09
Payer: COMMERCIAL

## 2019-12-09 ENCOUNTER — RESULTS ONLY (OUTPATIENT)
Dept: RADIATION ONCOLOGY | Facility: HOSPITAL | Age: 74
End: 2019-12-09

## 2019-12-09 LAB
RAD ONC ARIA COURSE ID: NORMAL
RAD ONC ARIA COURSE LAST TREATMENT DATE: NORMAL
RAD ONC ARIA COURSE START DATE: NORMAL
RAD ONC ARIA COURSE TREATMENT ELAPSED DAYS: 21
RAD ONC ARIA FIRST TREATMENT DATE: NORMAL
RAD ONC ARIA PLAN FRACTIONS TREATED TO DATE: 6
RAD ONC ARIA PLAN ID: NORMAL
RAD ONC ARIA PLAN NAME: NORMAL
RAD ONC ARIA PLAN PRESCRIBED DOSE PER FRACTION: 2.5 GY
RAD ONC ARIA PLAN TOTAL FRACTIONS PRESCRIBED: 23
RAD ONC ARIA PLAN TOTAL PRESCRIBED DOSE: 5750 CGY
RAD ONC ARIA REFERENCE POINT DOSAGE GIVEN TO DATE: NORMAL GY
RAD ONC ARIA REFERENCE POINT ID: NORMAL

## 2019-12-09 PROCEDURE — 77014 ZZHC CT GUIDE FOR PLACEMENT RADIATION THERAPY FIELDS: CPT | Mod: 26 | Performed by: RADIOLOGY

## 2019-12-09 PROCEDURE — 77385 ZZH IMRT TREATMENT DELIVERY, SIMPLE: CPT | Performed by: RADIOLOGY

## 2019-12-10 ENCOUNTER — APPOINTMENT (OUTPATIENT)
Dept: RADIATION ONCOLOGY | Facility: HOSPITAL | Age: 74
End: 2019-12-10
Payer: COMMERCIAL

## 2019-12-10 ENCOUNTER — RESULTS ONLY (OUTPATIENT)
Dept: RADIATION ONCOLOGY | Facility: HOSPITAL | Age: 74
End: 2019-12-10

## 2019-12-10 LAB
RAD ONC ARIA COURSE ID: NORMAL
RAD ONC ARIA COURSE LAST TREATMENT DATE: NORMAL
RAD ONC ARIA COURSE START DATE: NORMAL
RAD ONC ARIA COURSE TREATMENT ELAPSED DAYS: 22
RAD ONC ARIA FIRST TREATMENT DATE: NORMAL
RAD ONC ARIA PLAN FRACTIONS TREATED TO DATE: 7
RAD ONC ARIA PLAN ID: NORMAL
RAD ONC ARIA PLAN NAME: NORMAL
RAD ONC ARIA PLAN PRESCRIBED DOSE PER FRACTION: 2.5 GY
RAD ONC ARIA PLAN TOTAL FRACTIONS PRESCRIBED: 23
RAD ONC ARIA PLAN TOTAL PRESCRIBED DOSE: 5750 CGY
RAD ONC ARIA REFERENCE POINT DOSAGE GIVEN TO DATE: NORMAL GY
RAD ONC ARIA REFERENCE POINT ID: NORMAL

## 2019-12-10 PROCEDURE — 77014 ZZHC CT GUIDE FOR PLACEMENT RADIATION THERAPY FIELDS: CPT | Mod: 26 | Performed by: RADIOLOGY

## 2019-12-10 PROCEDURE — 77385 ZZH IMRT TREATMENT DELIVERY, SIMPLE: CPT | Performed by: RADIOLOGY

## 2019-12-11 ENCOUNTER — RESULTS ONLY (OUTPATIENT)
Dept: RADIATION ONCOLOGY | Facility: HOSPITAL | Age: 74
End: 2019-12-11

## 2019-12-11 ENCOUNTER — OFFICE VISIT (OUTPATIENT)
Dept: RADIATION ONCOLOGY | Facility: HOSPITAL | Age: 74
End: 2019-12-11
Payer: COMMERCIAL

## 2019-12-11 VITALS
DIASTOLIC BLOOD PRESSURE: 68 MMHG | BODY MASS INDEX: 33.7 KG/M2 | HEART RATE: 52 BPM | RESPIRATION RATE: 16 BRPM | WEIGHT: 248.5 LBS | SYSTOLIC BLOOD PRESSURE: 162 MMHG

## 2019-12-11 DIAGNOSIS — C61 PROSTATE CANCER (H): Primary | ICD-10-CM

## 2019-12-11 LAB
RAD ONC ARIA COURSE ID: NORMAL
RAD ONC ARIA COURSE LAST TREATMENT DATE: NORMAL
RAD ONC ARIA COURSE START DATE: NORMAL
RAD ONC ARIA COURSE TREATMENT ELAPSED DAYS: 23
RAD ONC ARIA FIRST TREATMENT DATE: NORMAL
RAD ONC ARIA PLAN FRACTIONS TREATED TO DATE: 8
RAD ONC ARIA PLAN ID: NORMAL
RAD ONC ARIA PLAN NAME: NORMAL
RAD ONC ARIA PLAN PRESCRIBED DOSE PER FRACTION: 2.5 GY
RAD ONC ARIA PLAN TOTAL FRACTIONS PRESCRIBED: 23
RAD ONC ARIA PLAN TOTAL PRESCRIBED DOSE: 5750 CGY
RAD ONC ARIA REFERENCE POINT DOSAGE GIVEN TO DATE: NORMAL GY
RAD ONC ARIA REFERENCE POINT ID: NORMAL

## 2019-12-11 PROCEDURE — 77336 RADIATION PHYSICS CONSULT: CPT | Performed by: RADIOLOGY

## 2019-12-11 PROCEDURE — 77385 ZZH IMRT TREATMENT DELIVERY, SIMPLE: CPT | Performed by: RADIOLOGY

## 2019-12-11 ASSESSMENT — PAIN SCALES - GENERAL: PAINLEVEL: NO PAIN (0)

## 2019-12-11 NOTE — PROGRESS NOTES
Radiation Oncology - On Treatment Visit Note     ID: 74 year old male with The encounter diagnosis was Prostate cancer (H).. He is here today during treatment for an on treatment visit.     Planned Total RT Dose: 71.5 Gy (14 Gy initial plan + 57.5 Gy revised plan)  Current Fraction: 15 of 30 (7 initial plan, 23 revised plan).   Concurrent Chemotherapy: ADT     SUBJECTIVE:   Doing well, no changes.      OBJECTIVE:   Vital Signs: BP (!) 162/68 (BP Location: Left arm, Patient Position: Chair, Cuff Size: Adult Large)   Pulse 52   Resp 16   Wt 112.7 kg (248 lb 8 oz)   BMI 33.70 kg/m   Pain Score: 0   Wt Readings from Last 5 Encounters:   12/11/19 112.7 kg (248 lb 8 oz)   12/04/19 110.8 kg (244 lb 3.2 oz)   11/27/19 112.4 kg (247 lb 12.8 oz)   11/20/19 112.1 kg (247 lb 3.2 oz)   09/24/19 109.6 kg (241 lb 11.2 oz)      ASSESSMENT/PLAN:   Unfavorable intermediate risk prostate cancer (cT2b, [place] 4+3, pretreatment PSA 11.7 ng/ml)  Definitive EBRT + ADT  The patient's chart and films were reviewed.   Cont RT.   Initial plan used 2Gy/fx; I increased to 2.5Gy/fx after patient requested possible shorter treatment.  Reduced from 38 to 30 total fxs.  (BED equivalent of 70Gy/28fxs).    Tolerating well to date.    Cont double dose Flomax.  Can return to regular dose 1-2 months after RT is completed.      Leo Santos MD, MPH   Radiation Oncologist - [facility]

## 2019-12-12 ENCOUNTER — RESULTS ONLY (OUTPATIENT)
Dept: RADIATION ONCOLOGY | Facility: HOSPITAL | Age: 74
End: 2019-12-12

## 2019-12-12 ENCOUNTER — APPOINTMENT (OUTPATIENT)
Dept: RADIATION ONCOLOGY | Facility: HOSPITAL | Age: 74
End: 2019-12-12
Payer: COMMERCIAL

## 2019-12-12 LAB
RAD ONC ARIA COURSE ID: NORMAL
RAD ONC ARIA COURSE LAST TREATMENT DATE: NORMAL
RAD ONC ARIA COURSE START DATE: NORMAL
RAD ONC ARIA COURSE TREATMENT ELAPSED DAYS: 24
RAD ONC ARIA FIRST TREATMENT DATE: NORMAL
RAD ONC ARIA PLAN FRACTIONS TREATED TO DATE: 9
RAD ONC ARIA PLAN ID: NORMAL
RAD ONC ARIA PLAN NAME: NORMAL
RAD ONC ARIA PLAN PRESCRIBED DOSE PER FRACTION: 2.5 GY
RAD ONC ARIA PLAN TOTAL FRACTIONS PRESCRIBED: 23
RAD ONC ARIA PLAN TOTAL PRESCRIBED DOSE: 5750 CGY
RAD ONC ARIA REFERENCE POINT DOSAGE GIVEN TO DATE: NORMAL GY
RAD ONC ARIA REFERENCE POINT ID: NORMAL

## 2019-12-12 PROCEDURE — 77385 ZZH IMRT TREATMENT DELIVERY, SIMPLE: CPT | Performed by: RADIOLOGY

## 2019-12-12 PROCEDURE — 77014 ZZHC CT GUIDE FOR PLACEMENT RADIATION THERAPY FIELDS: CPT | Mod: 26 | Performed by: RADIOLOGY

## 2019-12-13 ENCOUNTER — APPOINTMENT (OUTPATIENT)
Dept: RADIATION ONCOLOGY | Facility: HOSPITAL | Age: 74
End: 2019-12-13
Payer: COMMERCIAL

## 2019-12-13 ENCOUNTER — RESULTS ONLY (OUTPATIENT)
Dept: RADIATION ONCOLOGY | Facility: HOSPITAL | Age: 74
End: 2019-12-13

## 2019-12-13 LAB
RAD ONC ARIA COURSE ID: NORMAL
RAD ONC ARIA COURSE LAST TREATMENT DATE: NORMAL
RAD ONC ARIA COURSE START DATE: NORMAL
RAD ONC ARIA COURSE TREATMENT ELAPSED DAYS: 25
RAD ONC ARIA FIRST TREATMENT DATE: NORMAL
RAD ONC ARIA PLAN FRACTIONS TREATED TO DATE: 10
RAD ONC ARIA PLAN ID: NORMAL
RAD ONC ARIA PLAN NAME: NORMAL
RAD ONC ARIA PLAN PRESCRIBED DOSE PER FRACTION: 2.5 GY
RAD ONC ARIA PLAN TOTAL FRACTIONS PRESCRIBED: 23
RAD ONC ARIA PLAN TOTAL PRESCRIBED DOSE: 5750 CGY
RAD ONC ARIA REFERENCE POINT DOSAGE GIVEN TO DATE: NORMAL GY
RAD ONC ARIA REFERENCE POINT ID: NORMAL

## 2019-12-13 PROCEDURE — 77014 ZZHC CT GUIDE FOR PLACEMENT RADIATION THERAPY FIELDS: CPT | Mod: 26 | Performed by: RADIOLOGY

## 2019-12-13 PROCEDURE — 77385 ZZH IMRT TREATMENT DELIVERY, SIMPLE: CPT | Performed by: RADIOLOGY

## 2019-12-16 ENCOUNTER — APPOINTMENT (OUTPATIENT)
Dept: RADIATION ONCOLOGY | Facility: HOSPITAL | Age: 74
End: 2019-12-16
Payer: COMMERCIAL

## 2019-12-16 ENCOUNTER — RESULTS ONLY (OUTPATIENT)
Dept: RADIATION ONCOLOGY | Facility: HOSPITAL | Age: 74
End: 2019-12-16

## 2019-12-16 LAB
RAD ONC ARIA COURSE ID: NORMAL
RAD ONC ARIA COURSE LAST TREATMENT DATE: NORMAL
RAD ONC ARIA COURSE START DATE: NORMAL
RAD ONC ARIA COURSE TREATMENT ELAPSED DAYS: 28
RAD ONC ARIA FIRST TREATMENT DATE: NORMAL
RAD ONC ARIA PLAN FRACTIONS TREATED TO DATE: 11
RAD ONC ARIA PLAN ID: NORMAL
RAD ONC ARIA PLAN NAME: NORMAL
RAD ONC ARIA PLAN PRESCRIBED DOSE PER FRACTION: 2.5 GY
RAD ONC ARIA PLAN TOTAL FRACTIONS PRESCRIBED: 23
RAD ONC ARIA PLAN TOTAL PRESCRIBED DOSE: 5750 CGY
RAD ONC ARIA REFERENCE POINT DOSAGE GIVEN TO DATE: NORMAL GY
RAD ONC ARIA REFERENCE POINT ID: NORMAL

## 2019-12-16 PROCEDURE — 77014 ZZHC CT GUIDE FOR PLACEMENT RADIATION THERAPY FIELDS: CPT | Mod: 26 | Performed by: RADIOLOGY

## 2019-12-16 PROCEDURE — 77385 ZZH IMRT TREATMENT DELIVERY, SIMPLE: CPT | Performed by: RADIOLOGY

## 2019-12-17 ENCOUNTER — RESULTS ONLY (OUTPATIENT)
Dept: RADIATION ONCOLOGY | Facility: HOSPITAL | Age: 74
End: 2019-12-17

## 2019-12-17 ENCOUNTER — APPOINTMENT (OUTPATIENT)
Dept: RADIATION ONCOLOGY | Facility: HOSPITAL | Age: 74
End: 2019-12-17
Payer: COMMERCIAL

## 2019-12-17 LAB
RAD ONC ARIA COURSE ID: NORMAL
RAD ONC ARIA COURSE LAST TREATMENT DATE: NORMAL
RAD ONC ARIA COURSE START DATE: NORMAL
RAD ONC ARIA COURSE TREATMENT ELAPSED DAYS: 29
RAD ONC ARIA FIRST TREATMENT DATE: NORMAL
RAD ONC ARIA PLAN FRACTIONS TREATED TO DATE: 12
RAD ONC ARIA PLAN ID: NORMAL
RAD ONC ARIA PLAN NAME: NORMAL
RAD ONC ARIA PLAN PRESCRIBED DOSE PER FRACTION: 2.5 GY
RAD ONC ARIA PLAN TOTAL FRACTIONS PRESCRIBED: 23
RAD ONC ARIA PLAN TOTAL PRESCRIBED DOSE: 5750 CGY
RAD ONC ARIA REFERENCE POINT DOSAGE GIVEN TO DATE: NORMAL GY
RAD ONC ARIA REFERENCE POINT ID: NORMAL

## 2019-12-17 PROCEDURE — 77385 ZZH IMRT TREATMENT DELIVERY, SIMPLE: CPT | Performed by: RADIOLOGY

## 2019-12-17 PROCEDURE — 77014 ZZHC CT GUIDE FOR PLACEMENT RADIATION THERAPY FIELDS: CPT | Mod: 26 | Performed by: RADIOLOGY

## 2019-12-18 ENCOUNTER — RESULTS ONLY (OUTPATIENT)
Dept: RADIATION ONCOLOGY | Facility: HOSPITAL | Age: 74
End: 2019-12-18

## 2019-12-18 ENCOUNTER — OFFICE VISIT (OUTPATIENT)
Dept: RADIATION ONCOLOGY | Facility: HOSPITAL | Age: 74
End: 2019-12-18
Attending: RADIOLOGY
Payer: MEDICARE

## 2019-12-18 VITALS
WEIGHT: 246 LBS | RESPIRATION RATE: 16 BRPM | HEART RATE: 52 BPM | SYSTOLIC BLOOD PRESSURE: 146 MMHG | BODY MASS INDEX: 33.36 KG/M2 | DIASTOLIC BLOOD PRESSURE: 58 MMHG

## 2019-12-18 DIAGNOSIS — C61 PROSTATE CANCER (H): Primary | ICD-10-CM

## 2019-12-18 LAB
RAD ONC ARIA COURSE ID: NORMAL
RAD ONC ARIA COURSE LAST TREATMENT DATE: NORMAL
RAD ONC ARIA COURSE START DATE: NORMAL
RAD ONC ARIA COURSE TREATMENT ELAPSED DAYS: 30
RAD ONC ARIA FIRST TREATMENT DATE: NORMAL
RAD ONC ARIA PLAN FRACTIONS TREATED TO DATE: 13
RAD ONC ARIA PLAN ID: NORMAL
RAD ONC ARIA PLAN NAME: NORMAL
RAD ONC ARIA PLAN PRESCRIBED DOSE PER FRACTION: 2.5 GY
RAD ONC ARIA PLAN TOTAL FRACTIONS PRESCRIBED: 23
RAD ONC ARIA PLAN TOTAL PRESCRIBED DOSE: 5750 CGY
RAD ONC ARIA REFERENCE POINT DOSAGE GIVEN TO DATE: NORMAL GY
RAD ONC ARIA REFERENCE POINT ID: NORMAL

## 2019-12-18 PROCEDURE — 77014 ZZHC CT GUIDE FOR PLACEMENT RADIATION THERAPY FIELDS: CPT | Mod: 26 | Performed by: RADIOLOGY

## 2019-12-18 PROCEDURE — 77336 RADIATION PHYSICS CONSULT: CPT | Performed by: RADIOLOGY

## 2019-12-18 PROCEDURE — 77385 ZZH IMRT TREATMENT DELIVERY, SIMPLE: CPT | Performed by: RADIOLOGY

## 2019-12-18 PROCEDURE — 77427 RADIATION TX MANAGEMENT X5: CPT | Performed by: RADIOLOGY

## 2019-12-18 ASSESSMENT — PAIN SCALES - GENERAL: PAINLEVEL: NO PAIN (0)

## 2019-12-18 NOTE — PROGRESS NOTES
Radiation Oncology - On Treatment Visit Note     DIAGNOSIS:   The encounter diagnosis was Prostate cancer (H).     Planned Total RT Dose: 71.5 Gy (14 Gy initial plan + 57.5 Gy revised plan).  Initial plan used 2 Gy/fx; the daily dose was increased to 2.5Gy/fx after patient requested possible shorter treatment.  Reduced from 38 to 30 total fxs.  (BED equivalent of 70Gy/28fxs).  Current Cumulative Dose: 46.5 Gy.   Current Fraction: 20 of 30 (7 initial plan, 23 revised plan).   Concurrent ADT     SUBJECTIVE:   Doing well.  He has some urinary frequency and minimal dysuria.  Nocturia x 2-3.  No bowel problems.  Some mild fatigue.    OBJECTIVE:   Vital Signs: BP (!) 146/58 (BP Location: Left arm, Patient Position: Chair, Cuff Size: Adult Large)   Pulse 52   Resp 16   Wt 111.6 kg (246 lb)   BMI 33.36 kg/m   Pain Score: 0   Wt Readings from Last 5 Encounters:   12/18/19 111.6 kg (246 lb)   12/11/19 112.7 kg (248 lb 8 oz)   12/04/19 110.8 kg (244 lb 3.2 oz)   11/27/19 112.4 kg (247 lb 12.8 oz)   11/20/19 112.1 kg (247 lb 3.2 oz)      ASSESSMENT:   Unfavorable intermediate risk prostate cancer (cT2b, [place] 4+3, pretreatment PSA 11.7 ng/ml), tolerating treatment well.    PLAN:  Continue radiation therapy as planned.    Thank you for asking us to participate in the care of Max Calabrese.    Millicent Isaacs MD   Radiation Oncologist - [facility]

## 2019-12-19 ENCOUNTER — APPOINTMENT (OUTPATIENT)
Dept: RADIATION ONCOLOGY | Facility: HOSPITAL | Age: 74
End: 2019-12-19
Payer: COMMERCIAL

## 2019-12-19 ENCOUNTER — RESULTS ONLY (OUTPATIENT)
Dept: RADIATION ONCOLOGY | Facility: HOSPITAL | Age: 74
End: 2019-12-19

## 2019-12-19 LAB
RAD ONC ARIA COURSE ID: NORMAL
RAD ONC ARIA COURSE LAST TREATMENT DATE: NORMAL
RAD ONC ARIA COURSE START DATE: NORMAL
RAD ONC ARIA COURSE TREATMENT ELAPSED DAYS: 31
RAD ONC ARIA FIRST TREATMENT DATE: NORMAL
RAD ONC ARIA PLAN FRACTIONS TREATED TO DATE: 14
RAD ONC ARIA PLAN ID: NORMAL
RAD ONC ARIA PLAN NAME: NORMAL
RAD ONC ARIA PLAN PRESCRIBED DOSE PER FRACTION: 2.5 GY
RAD ONC ARIA PLAN TOTAL FRACTIONS PRESCRIBED: 23
RAD ONC ARIA PLAN TOTAL PRESCRIBED DOSE: 5750 CGY
RAD ONC ARIA REFERENCE POINT DOSAGE GIVEN TO DATE: NORMAL GY
RAD ONC ARIA REFERENCE POINT ID: NORMAL

## 2019-12-19 PROCEDURE — 77014 ZZHC CT GUIDE FOR PLACEMENT RADIATION THERAPY FIELDS: CPT | Mod: 26 | Performed by: RADIOLOGY

## 2019-12-19 PROCEDURE — 77385 ZZH IMRT TREATMENT DELIVERY, SIMPLE: CPT | Performed by: RADIOLOGY

## 2019-12-20 ENCOUNTER — APPOINTMENT (OUTPATIENT)
Dept: RADIATION ONCOLOGY | Facility: HOSPITAL | Age: 74
End: 2019-12-20
Payer: COMMERCIAL

## 2019-12-20 ENCOUNTER — RESULTS ONLY (OUTPATIENT)
Dept: RADIATION ONCOLOGY | Facility: HOSPITAL | Age: 74
End: 2019-12-20

## 2019-12-20 LAB
RAD ONC ARIA COURSE ID: NORMAL
RAD ONC ARIA COURSE LAST TREATMENT DATE: NORMAL
RAD ONC ARIA COURSE START DATE: NORMAL
RAD ONC ARIA COURSE TREATMENT ELAPSED DAYS: 32
RAD ONC ARIA FIRST TREATMENT DATE: NORMAL
RAD ONC ARIA PLAN FRACTIONS TREATED TO DATE: 15
RAD ONC ARIA PLAN ID: NORMAL
RAD ONC ARIA PLAN NAME: NORMAL
RAD ONC ARIA PLAN PRESCRIBED DOSE PER FRACTION: 2.5 GY
RAD ONC ARIA PLAN TOTAL FRACTIONS PRESCRIBED: 23
RAD ONC ARIA PLAN TOTAL PRESCRIBED DOSE: 5750 CGY
RAD ONC ARIA REFERENCE POINT DOSAGE GIVEN TO DATE: NORMAL GY
RAD ONC ARIA REFERENCE POINT ID: NORMAL

## 2019-12-20 PROCEDURE — 77014 ZZHC CT GUIDE FOR PLACEMENT RADIATION THERAPY FIELDS: CPT | Mod: 26 | Performed by: RADIOLOGY

## 2019-12-20 PROCEDURE — 77385 ZZH IMRT TREATMENT DELIVERY, SIMPLE: CPT | Performed by: RADIOLOGY

## 2019-12-23 ENCOUNTER — APPOINTMENT (OUTPATIENT)
Dept: RADIATION ONCOLOGY | Facility: HOSPITAL | Age: 74
End: 2019-12-23
Payer: COMMERCIAL

## 2019-12-23 ENCOUNTER — RESULTS ONLY (OUTPATIENT)
Dept: RADIATION ONCOLOGY | Facility: HOSPITAL | Age: 74
End: 2019-12-23

## 2019-12-23 LAB
RAD ONC ARIA COURSE ID: NORMAL
RAD ONC ARIA COURSE LAST TREATMENT DATE: NORMAL
RAD ONC ARIA COURSE START DATE: NORMAL
RAD ONC ARIA COURSE TREATMENT ELAPSED DAYS: 35
RAD ONC ARIA FIRST TREATMENT DATE: NORMAL
RAD ONC ARIA PLAN FRACTIONS TREATED TO DATE: 16
RAD ONC ARIA PLAN ID: NORMAL
RAD ONC ARIA PLAN NAME: NORMAL
RAD ONC ARIA PLAN PRESCRIBED DOSE PER FRACTION: 2.5 GY
RAD ONC ARIA PLAN TOTAL FRACTIONS PRESCRIBED: 23
RAD ONC ARIA PLAN TOTAL PRESCRIBED DOSE: 5750 CGY
RAD ONC ARIA REFERENCE POINT DOSAGE GIVEN TO DATE: NORMAL GY
RAD ONC ARIA REFERENCE POINT ID: NORMAL

## 2019-12-23 PROCEDURE — 77385 ZZH IMRT TREATMENT DELIVERY, SIMPLE: CPT | Performed by: RADIOLOGY

## 2019-12-23 PROCEDURE — 77014 ZZHC CT GUIDE FOR PLACEMENT RADIATION THERAPY FIELDS: CPT | Mod: 26 | Performed by: RADIOLOGY

## 2019-12-24 ENCOUNTER — RESULTS ONLY (OUTPATIENT)
Dept: RADIATION ONCOLOGY | Facility: HOSPITAL | Age: 74
End: 2019-12-24

## 2019-12-24 ENCOUNTER — APPOINTMENT (OUTPATIENT)
Dept: RADIATION ONCOLOGY | Facility: HOSPITAL | Age: 74
End: 2019-12-24
Payer: COMMERCIAL

## 2019-12-24 LAB
RAD ONC ARIA COURSE ID: NORMAL
RAD ONC ARIA COURSE LAST TREATMENT DATE: NORMAL
RAD ONC ARIA COURSE START DATE: NORMAL
RAD ONC ARIA COURSE TREATMENT ELAPSED DAYS: 36
RAD ONC ARIA FIRST TREATMENT DATE: NORMAL
RAD ONC ARIA PLAN FRACTIONS TREATED TO DATE: 17
RAD ONC ARIA PLAN ID: NORMAL
RAD ONC ARIA PLAN NAME: NORMAL
RAD ONC ARIA PLAN PRESCRIBED DOSE PER FRACTION: 2.5 GY
RAD ONC ARIA PLAN TOTAL FRACTIONS PRESCRIBED: 23
RAD ONC ARIA PLAN TOTAL PRESCRIBED DOSE: 5750 CGY
RAD ONC ARIA REFERENCE POINT DOSAGE GIVEN TO DATE: NORMAL GY
RAD ONC ARIA REFERENCE POINT ID: NORMAL

## 2019-12-24 PROCEDURE — 77014 ZZHC CT GUIDE FOR PLACEMENT RADIATION THERAPY FIELDS: CPT | Mod: 26 | Performed by: RADIOLOGY

## 2019-12-24 PROCEDURE — 77385 ZZH IMRT TREATMENT DELIVERY, SIMPLE: CPT | Performed by: RADIOLOGY

## 2019-12-27 ENCOUNTER — RESULTS ONLY (OUTPATIENT)
Dept: RADIATION ONCOLOGY | Facility: HOSPITAL | Age: 74
End: 2019-12-27

## 2019-12-27 ENCOUNTER — OFFICE VISIT (OUTPATIENT)
Dept: RADIATION ONCOLOGY | Facility: HOSPITAL | Age: 74
End: 2019-12-27
Payer: COMMERCIAL

## 2019-12-27 VITALS
SYSTOLIC BLOOD PRESSURE: 144 MMHG | WEIGHT: 246.4 LBS | HEART RATE: 52 BPM | DIASTOLIC BLOOD PRESSURE: 62 MMHG | RESPIRATION RATE: 16 BRPM | BODY MASS INDEX: 33.42 KG/M2

## 2019-12-27 DIAGNOSIS — C61 PROSTATE CANCER (H): Primary | ICD-10-CM

## 2019-12-27 LAB
RAD ONC ARIA COURSE ID: NORMAL
RAD ONC ARIA COURSE LAST TREATMENT DATE: NORMAL
RAD ONC ARIA COURSE START DATE: NORMAL
RAD ONC ARIA COURSE TREATMENT ELAPSED DAYS: 39
RAD ONC ARIA FIRST TREATMENT DATE: NORMAL
RAD ONC ARIA PLAN FRACTIONS TREATED TO DATE: 18
RAD ONC ARIA PLAN ID: NORMAL
RAD ONC ARIA PLAN NAME: NORMAL
RAD ONC ARIA PLAN PRESCRIBED DOSE PER FRACTION: 2.5 GY
RAD ONC ARIA PLAN TOTAL FRACTIONS PRESCRIBED: 23
RAD ONC ARIA PLAN TOTAL PRESCRIBED DOSE: 5750 CGY
RAD ONC ARIA REFERENCE POINT DOSAGE GIVEN TO DATE: NORMAL GY
RAD ONC ARIA REFERENCE POINT ID: NORMAL

## 2019-12-27 PROCEDURE — 77427 RADIATION TX MANAGEMENT X5: CPT | Performed by: RADIOLOGY

## 2019-12-27 PROCEDURE — 77014 ZZHC CT GUIDE FOR PLACEMENT RADIATION THERAPY FIELDS: CPT | Mod: 26 | Performed by: RADIOLOGY

## 2019-12-27 PROCEDURE — 77336 RADIATION PHYSICS CONSULT: CPT | Performed by: RADIOLOGY

## 2019-12-27 PROCEDURE — 77385 ZZH IMRT TREATMENT DELIVERY, SIMPLE: CPT | Performed by: RADIOLOGY

## 2019-12-27 ASSESSMENT — PAIN SCALES - GENERAL: PAINLEVEL: NO PAIN (0)

## 2019-12-27 NOTE — PROGRESS NOTES
Radiation Oncology - On Treatment Visit Note     ID: 74 year old male with The encounter diagnosis was Prostate cancer (H).. He is here today during treatment for an on treatment visit.     Planned Total RT Dose: 71.5 Gy (14 Gy initial plan + 57.5 Gy revised plan)  Current Fraction: 25 of 30 (7 initial plan, 23 revised plan).   Concurrent Chemotherapy: ADT     SUBJECTIVE:   Urination more frequent.      OBJECTIVE:   Vital Signs: BP (!) 144/62 (BP Location: Left arm, Patient Position: Chair, Cuff Size: Adult Large)   Pulse 52   Resp 16   Wt 111.8 kg (246 lb 6.4 oz)   BMI 33.42 kg/m   Pain Score: 0   Wt Readings from Last 5 Encounters:   12/27/19 111.8 kg (246 lb 6.4 oz)   12/18/19 111.6 kg (246 lb)   12/11/19 112.7 kg (248 lb 8 oz)   12/04/19 110.8 kg (244 lb 3.2 oz)   11/27/19 112.4 kg (247 lb 12.8 oz)      ASSESSMENT/PLAN:   Unfavorable intermediate risk prostate cancer (cT2b, [place] 4+3, pretreatment PSA 11.7 ng/ml)  Definitive EBRT + ADT  The patient's chart and films were reviewed.   Cont RT.   Initial plan used 2Gy/fx; I increased to 2.5Gy/fx after patient requested possible shorter treatment.  Reduced from 38 to 30 total fxs.  (BED equivalent of 70Gy/28fxs).    Tolerating well to date.    Cont double dose Flomax.  Can return to regular dose 1-2 months after RT is completed.      Leo Santos MD, MPH   Radiation Oncologist - [facility]

## 2019-12-31 ENCOUNTER — APPOINTMENT (OUTPATIENT)
Dept: RADIATION ONCOLOGY | Facility: HOSPITAL | Age: 74
End: 2019-12-31
Payer: COMMERCIAL

## 2019-12-31 ENCOUNTER — RESULTS ONLY (OUTPATIENT)
Dept: RADIATION ONCOLOGY | Facility: HOSPITAL | Age: 74
End: 2019-12-31

## 2019-12-31 LAB
RAD ONC ARIA COURSE ID: NORMAL
RAD ONC ARIA COURSE LAST TREATMENT DATE: NORMAL
RAD ONC ARIA COURSE START DATE: NORMAL
RAD ONC ARIA COURSE TREATMENT ELAPSED DAYS: 43
RAD ONC ARIA FIRST TREATMENT DATE: NORMAL
RAD ONC ARIA PLAN FRACTIONS TREATED TO DATE: 19
RAD ONC ARIA PLAN ID: NORMAL
RAD ONC ARIA PLAN NAME: NORMAL
RAD ONC ARIA PLAN PRESCRIBED DOSE PER FRACTION: 2.5 GY
RAD ONC ARIA PLAN TOTAL FRACTIONS PRESCRIBED: 23
RAD ONC ARIA PLAN TOTAL PRESCRIBED DOSE: 5750 CGY
RAD ONC ARIA REFERENCE POINT DOSAGE GIVEN TO DATE: NORMAL GY
RAD ONC ARIA REFERENCE POINT ID: NORMAL

## 2019-12-31 PROCEDURE — 77014 ZZHC CT GUIDE FOR PLACEMENT RADIATION THERAPY FIELDS: CPT | Mod: 26 | Performed by: RADIOLOGY

## 2019-12-31 PROCEDURE — 77385 ZZH IMRT TREATMENT DELIVERY, SIMPLE: CPT | Performed by: RADIOLOGY

## 2020-01-02 ENCOUNTER — RESULTS ONLY (OUTPATIENT)
Dept: RADIATION ONCOLOGY | Facility: HOSPITAL | Age: 75
End: 2020-01-02

## 2020-01-02 ENCOUNTER — OFFICE VISIT (OUTPATIENT)
Dept: RADIATION ONCOLOGY | Facility: HOSPITAL | Age: 75
End: 2020-01-02
Attending: RADIOLOGY
Payer: MEDICARE

## 2020-01-02 VITALS
DIASTOLIC BLOOD PRESSURE: 58 MMHG | BODY MASS INDEX: 33.32 KG/M2 | SYSTOLIC BLOOD PRESSURE: 148 MMHG | RESPIRATION RATE: 16 BRPM | HEART RATE: 52 BPM | WEIGHT: 245.7 LBS

## 2020-01-02 DIAGNOSIS — C61 PROSTATE CANCER (H): Primary | ICD-10-CM

## 2020-01-02 LAB
RAD ONC ARIA COURSE ID: NORMAL
RAD ONC ARIA COURSE LAST TREATMENT DATE: NORMAL
RAD ONC ARIA COURSE START DATE: NORMAL
RAD ONC ARIA COURSE TREATMENT ELAPSED DAYS: 45
RAD ONC ARIA FIRST TREATMENT DATE: NORMAL
RAD ONC ARIA PLAN FRACTIONS TREATED TO DATE: 20
RAD ONC ARIA PLAN ID: NORMAL
RAD ONC ARIA PLAN NAME: NORMAL
RAD ONC ARIA PLAN PRESCRIBED DOSE PER FRACTION: 2.5 GY
RAD ONC ARIA PLAN TOTAL FRACTIONS PRESCRIBED: 23
RAD ONC ARIA PLAN TOTAL PRESCRIBED DOSE: 5750 CGY
RAD ONC ARIA REFERENCE POINT DOSAGE GIVEN TO DATE: NORMAL GY
RAD ONC ARIA REFERENCE POINT ID: NORMAL

## 2020-01-02 PROCEDURE — 77014 ZZHC CT GUIDE FOR PLACEMENT RADIATION THERAPY FIELDS: CPT | Mod: 26 | Performed by: RADIOLOGY

## 2020-01-02 PROCEDURE — 77385 ZZH IMRT TREATMENT DELIVERY, SIMPLE: CPT | Performed by: RADIOLOGY

## 2020-01-02 PROCEDURE — 77336 RADIATION PHYSICS CONSULT: CPT | Performed by: RADIOLOGY

## 2020-01-02 PROCEDURE — 77427 RADIATION TX MANAGEMENT X5: CPT | Performed by: RADIOLOGY

## 2020-01-02 ASSESSMENT — PAIN SCALES - GENERAL: PAINLEVEL: NO PAIN (0)

## 2020-01-02 NOTE — PROGRESS NOTES
Radiation Oncology - On Treatment Visit Note     ID: 74 year old male with The encounter diagnosis was Prostate cancer (H).. He is here today during treatment for an on treatment visit.     Planned Total RT Dose: 71.5 Gy (14 Gy initial plan + 57.5 Gy revised plan)  Current Fraction: 27 of 30 (7 initial plan, 23 revised plan).   Concurrent Chemotherapy: ADT     SUBJECTIVE:   Doing well.  No changes.  Stools maybe slightly softer.  Reduced his Flomax back to one capsule daily.      OBJECTIVE:   Vital Signs: BP (!) 148/58 (BP Location: Right arm, Patient Position: Chair, Cuff Size: Adult Large)   Pulse 52   Resp 16   Wt 111.4 kg (245 lb 11.2 oz)   BMI 33.32 kg/m   Pain Score: 0   Wt Readings from Last 5 Encounters:   01/02/20 111.4 kg (245 lb 11.2 oz)   12/27/19 111.8 kg (246 lb 6.4 oz)   12/18/19 111.6 kg (246 lb)   12/11/19 112.7 kg (248 lb 8 oz)   12/04/19 110.8 kg (244 lb 3.2 oz)      ASSESSMENT:  Unfavorable intermediate risk prostate cancer (cT2b, [place] 4+3, pretreatment PSA 11.7 ng/ml)    PLAN:   Definitive EBRT + ADT  Initial plan used 2Gy/fx; I increased to 2.5Gy/fx after patient requested possible shorter treatment.  Reduced from 38 to 30 total fxs.  (BED equivalent of 70Gy/28fxs).    The patient's chart and films were reviewed.   Cont RT.   Reduced Flomax back to 0.4mg.      Leo Santos MD, MPH   Radiation Oncologist - [facility]

## 2020-01-03 ENCOUNTER — DOCUMENTATION ONLY (OUTPATIENT)
Dept: RADIATION ONCOLOGY | Facility: HOSPITAL | Age: 75
End: 2020-01-03

## 2020-01-03 ENCOUNTER — APPOINTMENT (OUTPATIENT)
Dept: RADIATION ONCOLOGY | Facility: HOSPITAL | Age: 75
End: 2020-01-03
Payer: COMMERCIAL

## 2020-01-03 ENCOUNTER — RESULTS ONLY (OUTPATIENT)
Dept: RADIATION ONCOLOGY | Facility: HOSPITAL | Age: 75
End: 2020-01-03

## 2020-01-03 DIAGNOSIS — C61 MALIGNANT NEOPLASM OF PROSTATE (H): ICD-10-CM

## 2020-01-03 LAB
RAD ONC ARIA COURSE ID: NORMAL
RAD ONC ARIA COURSE LAST TREATMENT DATE: NORMAL
RAD ONC ARIA COURSE START DATE: NORMAL
RAD ONC ARIA COURSE TREATMENT ELAPSED DAYS: 46
RAD ONC ARIA FIRST TREATMENT DATE: NORMAL
RAD ONC ARIA PLAN FRACTIONS TREATED TO DATE: 21
RAD ONC ARIA PLAN ID: NORMAL
RAD ONC ARIA PLAN NAME: NORMAL
RAD ONC ARIA PLAN PRESCRIBED DOSE PER FRACTION: 2.5 GY
RAD ONC ARIA PLAN TOTAL FRACTIONS PRESCRIBED: 23
RAD ONC ARIA PLAN TOTAL PRESCRIBED DOSE: 5750 CGY
RAD ONC ARIA REFERENCE POINT DOSAGE GIVEN TO DATE: NORMAL GY
RAD ONC ARIA REFERENCE POINT ID: NORMAL

## 2020-01-03 PROCEDURE — 77014 ZZHC CT GUIDE FOR PLACEMENT RADIATION THERAPY FIELDS: CPT | Mod: 26 | Performed by: RADIOLOGY

## 2020-01-03 PROCEDURE — 77385 ZZH IMRT TREATMENT DELIVERY, SIMPLE: CPT | Performed by: RADIOLOGY

## 2020-01-06 ENCOUNTER — RESULTS ONLY (OUTPATIENT)
Dept: RADIATION ONCOLOGY | Facility: HOSPITAL | Age: 75
End: 2020-01-06

## 2020-01-06 ENCOUNTER — APPOINTMENT (OUTPATIENT)
Dept: RADIATION ONCOLOGY | Facility: HOSPITAL | Age: 75
End: 2020-01-06
Payer: COMMERCIAL

## 2020-01-06 LAB
RAD ONC ARIA COURSE ID: NORMAL
RAD ONC ARIA COURSE LAST TREATMENT DATE: NORMAL
RAD ONC ARIA COURSE START DATE: NORMAL
RAD ONC ARIA COURSE TREATMENT ELAPSED DAYS: 49
RAD ONC ARIA FIRST TREATMENT DATE: NORMAL
RAD ONC ARIA PLAN FRACTIONS TREATED TO DATE: 22
RAD ONC ARIA PLAN ID: NORMAL
RAD ONC ARIA PLAN NAME: NORMAL
RAD ONC ARIA PLAN PRESCRIBED DOSE PER FRACTION: 2.5 GY
RAD ONC ARIA PLAN TOTAL FRACTIONS PRESCRIBED: 23
RAD ONC ARIA PLAN TOTAL PRESCRIBED DOSE: 5750 CGY
RAD ONC ARIA REFERENCE POINT DOSAGE GIVEN TO DATE: NORMAL GY
RAD ONC ARIA REFERENCE POINT ID: NORMAL

## 2020-01-06 PROCEDURE — 77014 ZZHC CT GUIDE FOR PLACEMENT RADIATION THERAPY FIELDS: CPT | Mod: 26 | Performed by: RADIOLOGY

## 2020-01-06 PROCEDURE — 77385 ZZH IMRT TREATMENT DELIVERY, SIMPLE: CPT | Performed by: RADIOLOGY

## 2020-01-14 ENCOUNTER — OFFICE VISIT (OUTPATIENT)
Dept: RADIATION ONCOLOGY | Facility: HOSPITAL | Age: 75
End: 2020-01-14
Attending: RADIOLOGY
Payer: COMMERCIAL

## 2020-01-14 ENCOUNTER — RESULTS ONLY (OUTPATIENT)
Dept: RADIATION ONCOLOGY | Facility: HOSPITAL | Age: 75
End: 2020-01-14

## 2020-01-14 VITALS
WEIGHT: 246 LBS | RESPIRATION RATE: 16 BRPM | SYSTOLIC BLOOD PRESSURE: 152 MMHG | BODY MASS INDEX: 33.36 KG/M2 | DIASTOLIC BLOOD PRESSURE: 80 MMHG | HEART RATE: 56 BPM

## 2020-01-14 DIAGNOSIS — C61 MALIGNANT NEOPLASM OF PROSTATE (H): Primary | ICD-10-CM

## 2020-01-14 LAB
RAD ONC ARIA COURSE ID: NORMAL
RAD ONC ARIA COURSE LAST TREATMENT DATE: NORMAL
RAD ONC ARIA COURSE START DATE: NORMAL
RAD ONC ARIA COURSE TREATMENT ELAPSED DAYS: 57
RAD ONC ARIA FIRST TREATMENT DATE: NORMAL
RAD ONC ARIA PLAN FRACTIONS TREATED TO DATE: 23
RAD ONC ARIA PLAN ID: NORMAL
RAD ONC ARIA PLAN NAME: NORMAL
RAD ONC ARIA PLAN PRESCRIBED DOSE PER FRACTION: 2.5 GY
RAD ONC ARIA PLAN TOTAL FRACTIONS PRESCRIBED: 23
RAD ONC ARIA PLAN TOTAL PRESCRIBED DOSE: 5750 CGY
RAD ONC ARIA REFERENCE POINT DOSAGE GIVEN TO DATE: NORMAL GY
RAD ONC ARIA REFERENCE POINT ID: NORMAL

## 2020-01-14 PROCEDURE — 77385 ZZH IMRT TREATMENT DELIVERY, SIMPLE: CPT | Performed by: RADIOLOGY

## 2020-01-14 PROCEDURE — 77014 ZZHC CT GUIDE FOR PLACEMENT RADIATION THERAPY FIELDS: CPT | Mod: 26 | Performed by: RADIOLOGY

## 2020-01-14 PROCEDURE — 77336 RADIATION PHYSICS CONSULT: CPT | Performed by: RADIOLOGY

## 2020-01-14 PROCEDURE — 77427 RADIATION TX MANAGEMENT X5: CPT | Performed by: RADIOLOGY

## 2020-01-14 PROCEDURE — G0463 HOSPITAL OUTPT CLINIC VISIT: HCPCS | Mod: 25

## 2020-01-14 PROCEDURE — G0463 HOSPITAL OUTPT CLINIC VISIT: HCPCS

## 2020-01-14 ASSESSMENT — PAIN SCALES - GENERAL: PAINLEVEL: NO PAIN (0)

## 2020-01-14 NOTE — PROGRESS NOTES
Radiation Oncology - On Treatment Visit Note & Completion of Therapy Note    ID: 74 year old male with The encounter diagnosis was Malignant neoplasm of prostate (H).. He is here today during treatment for his last treatment visit.     Date of service: 1/14/2020    Planned Total RT Dose: 71.5 Gy (14 Gy initial plan + 57.5 Gy revised plan)  Current Fraction: 30 of 30 (7 initial plan, 23 revised plan). Pt had his definitive radiotherapy today.  Concurrent Chemotherapy: ADT     SUBJECTIVE:   Doing well.  No change in his uropathy symptoms.  Patient reports he gets up maybe 3-4 times a night.  Occasional urge incontinence stools.  No complaints of proctitis symptoms only maybe slightly softer stools.  Reduced his Flomax back to one capsule daily.      OBJECTIVE:   Vital Signs: BP (!) 152/80 (BP Location: Left arm, Patient Position: Chair, Cuff Size: Adult Regular)   Pulse 56   Resp 16   Wt 111.6 kg (246 lb)   BMI 33.36 kg/m   Pain Score: 0   Wt Readings from Last 5 Encounters:   01/14/20 111.6 kg (246 lb)   01/02/20 111.4 kg (245 lb 11.2 oz)   12/27/19 111.8 kg (246 lb 6.4 oz)   12/18/19 111.6 kg (246 lb)   12/11/19 112.7 kg (248 lb 8 oz)   Patient in no acute distress.  He shows no signs of treatment related dermatitis in the perineum.  He has no lower extremity edema.  Abdomen is soft and nontender  ASSESSMENT:  Unfavorable intermediate risk prostate cancer (cT2b, [place] 4+3, pretreatment PSA 11.7 ng/ml) patient now completes his definitive course of external beam radiation therapy    PLAN:   Definitive EBRT + ADT  Initial plan was changed after 7 treatment fractions for a new total of 30 treatment fractions.  (BED equivalent of 70Gy/28fxs).    The patient's chart and films were reviewed.   RT complete  Reduced Flomax back to 0.4mg.        Completion of Therapy Note  Mr. Calabrese now completes a course of definitive radiotherapy for intermediate risk adenocarcinoma the prostate, [place] 7, PSA pretreatment 11.7  ng/mL.  Clinical stage T2b.    Intent of treatment was curative.  Area treated was prostate and seminal vesicles.  Technique used in treatment planning and delivery was IMRT/VMAT.    Patient initially started with a plan  to receive 37 treatment fractions but after 7 treatment fractions the patient requested to try and speed things up.  In his first phase,  he received 1400 cGy  delivered at 200 cGy a day and received 7 fractions.  He was treated with 6 MV photons and received a total dose of 1400 cGy for the first phase.    His second phase consisted of a hypo-fractionated plan with intentions to deliver 5750 cGy in 23 treatment fractions delivering 250 cGy/day.  Overall, the patient tolerated treatment well.  He had no significant change in his uropathy symptoms.  He actually was able to decrease his Flomax while he was nearing the end of his treatment.  We suggested that he make minor dietary changes to avoid fresh fruits and vegetables and perhaps use one half Imodium tablet each morning and that should help mild proctitis symptoms.    Total number of treatments he completed  30 out of 30 planned .  Total dose delivered 7150 cGy.  BED equivalent of 70 Gy/28 fractions.  Patient initiated therapy on November 18, 2019.  Patient completed his treatment today January 14, 2020.  Total duration of treatment was 57 elapsed calendar days.    Patient will follow-up with his urologist Dr. Watkins and Dr. Heladio Roy.  Patient understands there are no further follow-up appointments currently scheduled in radiation oncology department.  Patient understands he can contact us at anytime with questions or concerns.    Thank you for asking us to participate in the care of Mr. Max Calabrese.      Qiunn Jones M.D.  Radiation Oncologist - [facility]

## 2020-01-14 NOTE — PROGRESS NOTES
I have reviewed the Survivorship Careplan  with the patient.  A copy has been scanned into his/her medical record, a copy was given to the patient, and a copy will be sent to the patient's PCP and managing oncologist.    Edi Carballo RN

## 2025-04-17 NOTE — PROGRESS NOTES
Radiation Oncology - On Treatment Visit Note     ID: 74 year old male with The encounter diagnosis was Prostate cancer (H).. He is here today during treatment for an on treatment visit.     Planned Total RT Dose: 76 Gy.   Current Fraction: 7 of 38.   Concurrent Chemotherapy: ADT     SUBJECTIVE:   Some increased difficulty starting urination.  Maybe a slight burning when he urinates. Is on Flomax.      OBJECTIVE:   Vital Signs: BP (!) 154/66 (BP Location: Left arm, Patient Position: Chair, Cuff Size: Adult Large)   Pulse 52   Resp 16   Wt 112.4 kg (247 lb 12.8 oz)   BMI 33.61 kg/m   Pain Score: 0   Wt Readings from Last 5 Encounters:   11/27/19 112.4 kg (247 lb 12.8 oz)   11/20/19 112.1 kg (247 lb 3.2 oz)   09/24/19 109.6 kg (241 lb 11.2 oz)      ASSESSMENT/PLAN:   Unfavorable intermediate risk prostate cancer (cT2b, [place] 4+3, pretreatment PSA 11.7 ng/ml)  Definitive EBRT + ADT  The patient's chart and films were reviewed.   Cont RT.   I changed plan to hypofx.  Reduced from 38 to 30 total fxs.  (BED equivalent of 70Gy/28fxs)  Discussed increased Flomax dose vs adding in alpha inhibitor to address worsening urinary s/s.  He will hold off for now.      Leo Santos MD, MPH   Radiation Oncologist - [facility]     Report received from Donya PATTERSON RN